# Patient Record
Sex: FEMALE | Race: ASIAN
[De-identification: names, ages, dates, MRNs, and addresses within clinical notes are randomized per-mention and may not be internally consistent; named-entity substitution may affect disease eponyms.]

---

## 2019-03-29 ENCOUNTER — HOSPITAL ENCOUNTER (EMERGENCY)
Dept: HOSPITAL 62 - ER | Age: 29
LOS: 1 days | Discharge: HOME | End: 2019-03-30
Payer: COMMERCIAL

## 2019-03-29 DIAGNOSIS — R04.2: Primary | ICD-10-CM

## 2019-03-29 DIAGNOSIS — J02.9: ICD-10-CM

## 2019-03-29 LAB
ADD MANUAL DIFF: NO
ALBUMIN SERPL-MCNC: 4.3 G/DL (ref 3.5–5)
ALP SERPL-CCNC: 45 U/L (ref 38–126)
ALT SERPL-CCNC: 28 U/L (ref 9–52)
ANION GAP SERPL CALC-SCNC: 10 MMOL/L (ref 5–19)
AST SERPL-CCNC: 27 U/L (ref 14–36)
BASOPHILS # BLD AUTO: 0 10^3/UL (ref 0–0.2)
BASOPHILS NFR BLD AUTO: 0.5 % (ref 0–2)
BILIRUB DIRECT SERPL-MCNC: 0.2 MG/DL (ref 0–0.4)
BILIRUB SERPL-MCNC: 0.3 MG/DL (ref 0.2–1.3)
BUN SERPL-MCNC: 15 MG/DL (ref 7–20)
CALCIUM: 9.9 MG/DL (ref 8.4–10.2)
CHLORIDE SERPL-SCNC: 103 MMOL/L (ref 98–107)
CO2 SERPL-SCNC: 27 MMOL/L (ref 22–30)
EOSINOPHIL # BLD AUTO: 0.1 10^3/UL (ref 0–0.6)
EOSINOPHIL NFR BLD AUTO: 2.6 % (ref 0–6)
ERYTHROCYTE [DISTWIDTH] IN BLOOD BY AUTOMATED COUNT: 12.1 % (ref 11.5–14)
GLUCOSE SERPL-MCNC: 86 MG/DL (ref 75–110)
HCT VFR BLD CALC: 39.2 % (ref 36–47)
HGB BLD-MCNC: 13.5 G/DL (ref 12–15.5)
LYMPHOCYTES # BLD AUTO: 1.3 10^3/UL (ref 0.5–4.7)
LYMPHOCYTES NFR BLD AUTO: 28.2 % (ref 13–45)
MCH RBC QN AUTO: 32.9 PG (ref 27–33.4)
MCHC RBC AUTO-ENTMCNC: 34.3 G/DL (ref 32–36)
MCV RBC AUTO: 96 FL (ref 80–97)
MONOCYTES # BLD AUTO: 0.8 10^3/UL (ref 0.1–1.4)
MONOCYTES NFR BLD AUTO: 17.3 % (ref 3–13)
NEUTROPHILS # BLD AUTO: 2.4 10^3/UL (ref 1.7–8.2)
NEUTS SEG NFR BLD AUTO: 51.4 % (ref 42–78)
PLATELET # BLD: 172 10^3/UL (ref 150–450)
POTASSIUM SERPL-SCNC: 4.1 MMOL/L (ref 3.6–5)
PROT SERPL-MCNC: 7.4 G/DL (ref 6.3–8.2)
RBC # BLD AUTO: 4.09 10^6/UL (ref 3.72–5.28)
SODIUM SERPL-SCNC: 139.7 MMOL/L (ref 137–145)
TOTAL CELLS COUNTED % (AUTO): 100 %
WBC # BLD AUTO: 4.7 10^3/UL (ref 4–10.5)

## 2019-03-29 PROCEDURE — 36415 COLL VENOUS BLD VENIPUNCTURE: CPT

## 2019-03-29 PROCEDURE — 70360 X-RAY EXAM OF NECK: CPT

## 2019-03-29 PROCEDURE — 71046 X-RAY EXAM CHEST 2 VIEWS: CPT

## 2019-03-29 PROCEDURE — 80053 COMPREHEN METABOLIC PANEL: CPT

## 2019-03-29 PROCEDURE — 99283 EMERGENCY DEPT VISIT LOW MDM: CPT

## 2019-03-29 PROCEDURE — 85025 COMPLETE CBC W/AUTO DIFF WBC: CPT

## 2019-03-29 NOTE — RADIOLOGY REPORT (SQ)
EXAM DESCRIPTION:  CHEST 2 VIEWS



COMPLETED DATE/TIME:  3/29/2019 6:04 pm



REASON FOR STUDY:  hemoptysis



COMPARISON:  None.



EXAM PARAMETERS:  NUMBER OF VIEWS: two views

TECHNIQUE: Digital Frontal and Lateral radiographic views of the chest acquired.

RADIATION DOSE: NA

LIMITATIONS: none



FINDINGS:  LUNGS AND PLEURA: No opacities, masses or pneumothorax. No pleural effusion.

MEDIASTINUM AND HILAR STRUCTURES: No masses or contour abnormalities.

HEART AND VASCULAR STRUCTURES: Heart normal size.  No evidence for failure.

BONES: No acute findings.

HARDWARE: None in the chest.

OTHER: No other significant finding.



IMPRESSION:  NO ACUTE RADIOGRAPHIC FINDING IN THE CHEST.



TECHNICAL DOCUMENTATION:  JOB ID:  [id number]

 2011 "[employer]- All Rights Reserved



Reading location - IP/workstation name: [email or web address]

## 2019-03-29 NOTE — ER DOCUMENT REPORT
ED General





- General


Chief Complaint: Other


Stated Complaint: COUGHING UP BLOOD


Time Seen by Provider: 03/29/19 20:14


Primary Care Provider: 


KALIN AMOS [Primary Care Provider] - Follow up as needed


Mode of Arrival: Ambulatory


Notes: 


Patient is a 28-year-old female who presents with complaints of spitting up some

blood.  Patient says on Tuesday she had a fever.  Wednesday she also had limited

fever but then by Thursday fever gone away.  She also had a sore throat at that 

time.  She continued to have intermittent sore throat and then on Thursday she 

noticed that when she brushes her teeth little bit of blood came out when she 

spit.  Today she ate a piece of celery when she swallowed it she felt that 

scratch against the back of her throat and this caused a bit of pain and then 

she recalls her to spit up a little bit more blood.  She has not had any further

blood since then.  She has not had any further fevers.  She has not had any 

actual coughing.  She is from [place].  She moved here 9 months ago.  She says she

is pretty sure she got vaccinations as a child.





TRAVEL OUTSIDE OF THE [place] IN LAST 30 DAYS: No





- Related Data


Allergies/Adverse Reactions: 


                                        





No Known Allergies Allergy (Unverified 03/29/19 17:25)


   











Past Medical History





- General


Information source: Patient





- Social History


Smoking Status: Never Smoker


Chew tobacco use (# tins/day): No


Frequency of alcohol use: None


Drug Abuse: None


Family History: Reviewed & Not Pertinent


Patient has suicidal ideation: No


Patient has homicidal ideation: No


Renal/ Medical History: Denies: Hx Peritoneal Dialysis





Review of Systems





- Review of Systems


Notes: 





My Normal Review Basic





REVIEW OF SYSTEMS:


CONSTITUTIONAL : Fever 2-3 days ago.


EENT:   Sore throat.


CARDIOVASCULAR:  Denies chest pain.


RESPIRATORY:  Denies cough, cold, or chest congestion.  Denies shortness of 

breath, difficulty breathing, or wheezing.


GASTROINTESTINAL:  Denies abdominal pain.  Denies nausea, vomiting, or diarrhea.

 


MUSCULOSKELETAL:  Denies neck or back pain or joint pain or swelling.


SKIN:   Denies rash or skin lesions.


NEUROLOGICAL:  Denies altered mental status or loss of consciousness.  Denies 

headache.  Denies weakness or paralysis or loss of use of either side.  Denies 

problems with gait or speech.  Denies sensory or motor loss.


ALL OTHER SYSTEMS REVIEWED AND NEGATIVE.





Physical Exam





- Vital signs


Vitals: 


                                        











Temp Pulse Resp BP Pulse Ox


 


 99.3 F   77   16   105/68   97 


 


 03/29/19 17:32  03/29/19 17:32  03/29/19 17:32  03/29/19 17:32  03/29/19 17:32














- Notes


Notes: 





General Appearance: Well nourished, alert, cooperative, no acute distress, no 

obvious discomfort.  Well-appearing.


Vitals: reviewed, See vital signs table.


Eyes: PERRL, EOMI, Conjuctiva clear


Mouth: No decreasd moisture


Throat: No tonsillar inflammation, No airway obstruction,  No lymphadenopathy


Neck: Supple, no neck tenderness, No thyromegaly


Lungs: No wheezing, No rales, No rhonci, No accessory muscle use, good air 

exchange bilaterally.


Heart: Normal rate, Regular rythm, No murmur, no rub


Extremities:  good pulses in all extremities, no swelling or tenderness in the 

extremities, no edema.


Skin: warm, dry, appropriate color, no rash


Neuro: speech clear, oriented x 3, normal affect, responds appropriately to 

questions.





Course





- Re-evaluation


Re-evalutation: 





03/30/19 06:53


On evaluation I do not see anything concerning on pharyngeal exam.  X-ray was 

obtained.  I did review the x-ray with radiologist talked on the phone as I felt

there was some abnormal calcification in the larynx however the neurologist that

this is a normal variant.  Patient's blood work is completely normal.  There 

apparently was some concern for TB amongst triage nurse per the patient.  I do 

not suspect TB as the patient does not have actual respiratory symptoms.  She is

not coughing up blood.  She actually has sensation of a sore throat when she ate

something firm this caused more pain in her throat and she spit up some blood.  

She has had no true coughing.  She did have recent fever.  She is a 

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  Patient 

clinically looks well.  Chest x-ray is negative and shows no evidence of 

anything that would be concerning for TB.  I encouraged patient eat soft foods. 

I encouraged her return to ER if she has recurrent spitting up of blood, fevers,

difficulty breathing or swallowing, or if she feels unwell.  Patient agrees with

plan and will be discharged home.





Dictation of this chart was performed using voice recognition software; 

therefore, there may be some unintended grammatical errors.





- Vital Signs


Vital signs: 


                                        











Temp Pulse Resp BP Pulse Ox


 


 98.0 F   65   18   123/65   100 


 


 03/30/19 00:06  03/30/19 00:06  03/30/19 00:06  03/30/19 00:06  03/30/19 00:06














- Laboratory


Result Diagrams: 


                                 03/29/19 20:45





                                 03/29/19 20:45


Laboratory results interpreted by me: 


                                        











  03/29/19





  20:45


 


Monocytes %  17.3 H














Discharge





- Discharge


Clinical Impression: 


 Sore throat, Spitting up blood





Condition: Good


Disposition: HOME, SELF-CARE


Additional Instructions: 


Please avoid any foods that are hard or crunchy.  Please eat only soft foods.  

Currently your blood work and your x-ray does not show any concerning findings. 

I suspect you have an irritation to your throat which could be related to recent

viral infection.  Treatment is with acetaminophen or ibuprofen.  This will help 

with any pain or swelling.  Please take the liquid form so it does not irritate 

your throat.  Please have a low threshold to return to ER if you have recurring 

episodes of spitting up blood, recurrent fevers, worsening pain in her throat, 

difficulty breathing, or difficulty swallowing.  Follow-up with a doctor on 

Monday for reevaluation.  You are welcome to come here for reevaluation if you 

do not have a doctor to follow-up with.


Referrals: 


LOCALMD,NO [Primary Care Provider] - Follow up as needed

## 2019-03-29 NOTE — RADIOLOGY REPORT (SQ)
CLINICAL HISTORY:  sore throat 



COMPARISON: None.



TECHNIQUE: XR NECK SOFT TISSUE on 3/29/2019 10:56 PM CDT



This exam was performed according to our departmental

dose-optimization program, which includes automated exposure

control, adjustment of the mA and/or kV according to patient size

and/or use of iterative reconstruction technique.



FINDINGS: 



There is no acute fracture. Alignment is anatomic.



Disc spaces are maintained. Vertebral body heights are preserved.

Soft tissues are unremarkable.



IMPRESSION: 



No acute fracture or subluxation.

## 2019-03-29 NOTE — ER DOCUMENT REPORT
ED Medical Screen (RME)





- General


Chief Complaint: Other


Stated Complaint: COUGHING UP BLOOD


Time Seen by Provider: 03/29/19 20:14


Mode of Arrival: Ambulatory


Information source: Patient


TRAVEL OUTSIDE OF THE [place] IN LAST 30 DAYS: No





- HPI


Patient complains to provider of: hemoptysis


Onset: Yesterday - pt. with hemoptysis starting yesterday.





- Related Data


Allergies/Adverse Reactions: 


                                        





No Known Allergies Allergy (Unverified 03/29/19 17:25)


   











Physical Exam





- Vital signs


Vitals: 





                                        











Temp Pulse Resp BP Pulse Ox


 


 99.3 F   77   16   105/68   97 


 


 03/29/19 17:32  03/29/19 17:32  03/29/19 17:32  03/29/19 17:32  03/29/19 17:32














Course





- Vital Signs


Vital signs: 





                                        











Temp Pulse Resp BP Pulse Ox


 


 99.3 F   77   16   105/68   97 


 


 03/29/19 17:32  03/29/19 17:32  03/29/19 17:32  03/29/19 17:32  03/29/19 17:32

## 2019-03-30 VITALS — DIASTOLIC BLOOD PRESSURE: 65 MMHG | SYSTOLIC BLOOD PRESSURE: 123 MMHG
